# Patient Record
Sex: MALE | Race: WHITE | NOT HISPANIC OR LATINO | Employment: OTHER | URBAN - NONMETROPOLITAN AREA
[De-identification: names, ages, dates, MRNs, and addresses within clinical notes are randomized per-mention and may not be internally consistent; named-entity substitution may affect disease eponyms.]

---

## 2021-11-03 ENCOUNTER — OFFICE VISIT (OUTPATIENT)
Dept: ORTHOPEDIC SURGERY | Facility: CLINIC | Age: 41
End: 2021-11-03

## 2021-11-03 VITALS
HEART RATE: 96 BPM | BODY MASS INDEX: 21.14 KG/M2 | HEIGHT: 71 IN | DIASTOLIC BLOOD PRESSURE: 93 MMHG | SYSTOLIC BLOOD PRESSURE: 151 MMHG | WEIGHT: 151 LBS

## 2021-11-03 DIAGNOSIS — G56.03 BILATERAL CARPAL TUNNEL SYNDROME: ICD-10-CM

## 2021-11-03 DIAGNOSIS — M79.641 BILATERAL HAND PAIN: Primary | ICD-10-CM

## 2021-11-03 DIAGNOSIS — M79.642 BILATERAL HAND PAIN: Primary | ICD-10-CM

## 2021-11-03 DIAGNOSIS — R20.2 NUMBNESS AND TINGLING IN BOTH HANDS: ICD-10-CM

## 2021-11-03 DIAGNOSIS — R20.0 NUMBNESS AND TINGLING IN BOTH HANDS: ICD-10-CM

## 2021-11-03 PROCEDURE — 20526 THER INJECTION CARP TUNNEL: CPT | Performed by: ORTHOPAEDIC SURGERY

## 2021-11-03 PROCEDURE — 99203 OFFICE O/P NEW LOW 30 MIN: CPT | Performed by: ORTHOPAEDIC SURGERY

## 2021-11-03 RX ORDER — LIDOCAINE HYDROCHLORIDE 10 MG/ML
1 INJECTION, SOLUTION INFILTRATION; PERINEURAL
Status: COMPLETED | OUTPATIENT
Start: 2021-11-03 | End: 2021-11-03

## 2021-11-03 RX ORDER — TRIAMCINOLONE ACETONIDE 40 MG/ML
40 INJECTION, SUSPENSION INTRA-ARTICULAR; INTRAMUSCULAR
Status: COMPLETED | OUTPATIENT
Start: 2021-11-03 | End: 2021-11-03

## 2021-11-03 RX ADMIN — LIDOCAINE HYDROCHLORIDE 1 ML: 10 INJECTION, SOLUTION INFILTRATION; PERINEURAL at 15:02

## 2021-11-03 RX ADMIN — TRIAMCINOLONE ACETONIDE 40 MG: 40 INJECTION, SUSPENSION INTRA-ARTICULAR; INTRAMUSCULAR at 15:02

## 2021-11-03 NOTE — PROGRESS NOTES
Stephen Bennett is a 40 y.o. male   Primary provider:  Provider, No Known       Chief Complaint   Patient presents with   • Left Hand - Pain   • Right Hand - Pain       HISTORY OF PRESENT ILLNESS:  mary alice hand pain and numbness started about one year ago, no previous treatment.     This is the first office visit for evaluation of pain and burning in both hands.    Mr. Bennett is 40 years old and right-hand dominant.  He has had problems with both hands for about a year or so.  There is been no trauma and he denies any previous similar problems.  He complains of burning sensations in both hands most prominent at night.  He also has had numbness diffusely in both hands nearly symmetrically.  His pain is also worse with gripping.  He has some relief of his burning at night by putting his hands in a freezer.  Is had no active treatment for this.  There is been no recent change in his activities.    He is taking no medications and has no allergies.  He does not smoke.  His general health is good.  He is employed as a .  He has continued working despite his symptoms.  He is single.    Pain  This is a chronic problem. The current episode started more than 1 year ago. The problem occurs intermittently. The problem has been unchanged. Associated symptoms include joint swelling and numbness. Associated symptoms comments: Redness, swelling.. He has tried ice and heat for the symptoms.        CONCURRENT MEDICAL HISTORY:    Past Medical History:   Diagnosis Date   • Anxiety and depression        No Known Allergies    No current outpatient medications on file.    History reviewed. No pertinent surgical history.    Family History   Problem Relation Age of Onset   • Heart disease Other    • Cancer Other         Social History     Socioeconomic History   • Marital status: Single   Tobacco Use   • Smoking status: Current Every Day Smoker   • Smokeless tobacco: Never Used   Substance and Sexual Activity   • Alcohol use:  "Never   • Sexual activity: Defer        Review of Systems   Musculoskeletal: Positive for joint swelling.        Joint pain   Neurological: Positive for numbness.   Psychiatric/Behavioral: Positive for sleep disturbance. The patient is nervous/anxious.    All other systems reviewed and are negative.    Review of systems is positive as noted above.  PHYSICAL EXAMINATION:       Vitals:    11/03/21 1431   BP: 151/93   Pulse: 96   Weight: 68.5 kg (151 lb)   Height: 180.3 cm (71\")   PainSc: 0-No pain       Physical Exam he is thin but otherwise healthy-appearing alert pleasant and in no apparent distress at rest.  He responds appropriately to questions and commands.    GAIT:     [x]  Normal  []  Antalgic    Assistive device: [x]  None  []  Walker     []  Crutches  []  Cane     []  Wheelchair  []  Stretcher    Ortho Exam is directed to the upper extremities.  Digital wrist and forearm motions are full symmetrically.  There is no intrinsic atrophy in either hand.  There was no synovitis of the forearms.  Soft touch sensibility is decreased in the left thumb and radial aspect of the left ring finger as well as in the right small finger.  Vibratory sensibility is decreased in both thumbs.  Pressure Phalen testing is positive bilaterally right more than left with reproduction of his symptoms.  Tinel's sign is absent over the ulnar nerve at the elbow bilaterally.  Prolonged flexion of both elbows produces no pain or paresthesias.  Radial pulses 2+ symmetrically.  There are multiple tattoos on the upper extremities.      No results found.        ASSESSMENT: Bilateral hand pain and burning probably secondary to carpal tunnel syndrome.    The natural history of the disorder and treatment options were reviewed.  He was given the [employer] website to further educate himself.  He understands surgery may eventually be needed.    He was given splints to wear at nighttime.  I suggested he also obtain soft support for the wrist to wear " during the day.    Potential benefits of injection therapy were discussed.  He wished to proceed with this.    The right wrist was prepped.  Skin was infiltrated with 1% Xylocaine with epinephrine.  The flexor compartment of the distal forearm was then injected with a mixture of Xylocaine and Kenalog.  There were no complications.    Return here in 1 month.  If his symptoms have not responded to injection may need to consider obtaining EMG and nerve conduction studies.  If he has a good response to today's treatment may call for earlier evaluation for consideration of injection of the left wrist.    Diagnoses and all orders for this visit:    Bilateral hand pain    Numbness and tingling in both hands    Bilateral carpal tunnel syndrome    Other orders  -     Medium Joint Arthrocentesis        Medium Joint Arthrocentesis  Date/Time: 11/3/2021 3:02 PM  Consent given by: patient  Site marked: site marked  Timeout: Immediately prior to procedure a time out was called to verify the correct patient, procedure, equipment, support staff and site/side marked as required   Supporting Documentation  Indications: pain   Procedure Details  Location: wrist (right) -   Preparation: Patient was prepped and draped in the usual sterile fashion  Medications administered: 40 mg triamcinolone acetonide 40 MG/ML; 1 mL lidocaine 1 %  Patient tolerance: patient tolerated the procedure well with no immediate complications            PLAN    Return in about 4 weeks (around 12/1/2021).    Leonardo Galvez MD